# Patient Record
Sex: FEMALE | Race: WHITE
[De-identification: names, ages, dates, MRNs, and addresses within clinical notes are randomized per-mention and may not be internally consistent; named-entity substitution may affect disease eponyms.]

---

## 2018-12-08 ENCOUNTER — HOSPITAL ENCOUNTER (OUTPATIENT)
Dept: HOSPITAL 88 - OR | Age: 43
Discharge: HOME | End: 2018-12-08
Attending: INTERNAL MEDICINE
Payer: COMMERCIAL

## 2018-12-08 VITALS — SYSTOLIC BLOOD PRESSURE: 100 MMHG | DIASTOLIC BLOOD PRESSURE: 60 MMHG

## 2018-12-08 DIAGNOSIS — K44.9: ICD-10-CM

## 2018-12-08 DIAGNOSIS — K22.2: ICD-10-CM

## 2018-12-08 DIAGNOSIS — F41.9: ICD-10-CM

## 2018-12-08 DIAGNOSIS — K58.2: ICD-10-CM

## 2018-12-08 DIAGNOSIS — K64.8: ICD-10-CM

## 2018-12-08 DIAGNOSIS — Z88.5: ICD-10-CM

## 2018-12-08 DIAGNOSIS — K29.50: ICD-10-CM

## 2018-12-08 DIAGNOSIS — I34.1: ICD-10-CM

## 2018-12-08 DIAGNOSIS — N39.0: ICD-10-CM

## 2018-12-08 DIAGNOSIS — I49.3: ICD-10-CM

## 2018-12-08 DIAGNOSIS — I47.1: ICD-10-CM

## 2018-12-08 DIAGNOSIS — K21.0: Primary | ICD-10-CM

## 2018-12-08 DIAGNOSIS — K63.5: ICD-10-CM

## 2018-12-08 DIAGNOSIS — K22.9: ICD-10-CM

## 2018-12-08 DIAGNOSIS — G43.909: ICD-10-CM

## 2018-12-08 LAB — LACTOFERRIN STL QL: NEGATIVE

## 2018-12-08 PROCEDURE — 83993 ASSAY FOR CALPROTECTIN FECAL: CPT

## 2018-12-08 PROCEDURE — 45380 COLONOSCOPY AND BIOPSY: CPT

## 2018-12-08 PROCEDURE — 45385 COLONOSCOPY W/LESION REMOVAL: CPT

## 2018-12-08 PROCEDURE — 45384 COLONOSCOPY W/LESION REMOVAL: CPT

## 2018-12-08 PROCEDURE — 87177 OVA AND PARASITES SMEARS: CPT

## 2018-12-08 PROCEDURE — 87328 CRYPTOSPORIDIUM AG IA: CPT

## 2018-12-08 PROCEDURE — 43450 DILATE ESOPHAGUS 1/MULT PASS: CPT

## 2018-12-08 PROCEDURE — 83630 LACTOFERRIN FECAL (QUAL): CPT

## 2018-12-08 PROCEDURE — 81025 URINE PREGNANCY TEST: CPT

## 2018-12-08 PROCEDURE — 87493 C DIFF AMPLIFIED PROBE: CPT

## 2018-12-08 PROCEDURE — 93005 ELECTROCARDIOGRAM TRACING: CPT

## 2018-12-08 PROCEDURE — 87045 FECES CULTURE AEROBIC BACT: CPT

## 2018-12-08 PROCEDURE — 43239 EGD BIOPSY SINGLE/MULTIPLE: CPT

## 2018-12-08 NOTE — OPERATIVE REPORT
DATE OF PROCEDURE:  December 08, 2018 



REFERRING PHYSICIAN:  Dr. Roman Lentz.



PROCEDURES PERFORMED

1. Esophagogastroduodenoscopy with esophageal dilatation and biopsies.

Colonoscopy with polypectomy and biopsies.

1. 

INDICATIONS FOR ESOPHAGOGASTRODUODENOSCOPY:  Dysphagia to solids, acid 

reflux.



INDICATIONS FOR COLONOSCOPY:  Chronic diarrhea.



MEDICATION:  Patient was done under MAC.  Please see anesthesiologist's 

note.



PROCEDURE:  With patient in left lateral decubitus position, a flexible 

fiberoptic Olympus gastroscope was introduced into the esophagus under 

direct visualization without any difficulty.  There was some patchy 

erythema noted in distal esophagus.  There was a minute nodule noted just 

above the GE junction and that was biopsied.  The esophagus was dilated to 

size 52-Sudanese Taveras.  The scope was then advanced with ease into the 

stomach traversing a small sliding hiatal hernia.  Mucosa overlying the 

antrum and the body revealed some patchy erythema.  Pylorus appeared to be 

of normal contour and shape and was intubated with ease and the scope was 

advanced all the way to the 2nd portion of the duodenum.  Biopsies were 

obtained from the proximal and 2nd portion to rule out sprue.  The scope 

was then withdrawn back into the stomach and retroflexed, and mucosa 

overlying the fundus and the cardia appeared to be within normal limits.  

The scope was then straightened out and was subsequently withdrawn.  

Patient tolerated the procedure well.



IMPRESSION

1. Mild distal esophagitis.

2. Esophageal stricture dilated to a size 52-Sudanese Taveras.

3. Minute nodule gastroesophageal junction, biopsied.

4. Small sliding hiatal hernia.

5. Gastritis, mild.

6. Rule out sprue.



PLAN:  Follow up histology.  Initiate Protonix 40 mg 1 p.o. a.c. b.i.d. and 

Reglan 10 mg 1 p.o. before supper and at bedtime.



Patient was then turned around.  After adequate lubrication of the anal 

canal, a flexible fiberoptic Olympus colonoscope was inserted into the 

rectum with ease and advanced all the way to the cecum.  It was then 

withdrawn slowly and the mucosa overlying the cecum appeared to be within 

normal limits.  The ileocecal valve was intubated and scope was advanced 

into the terminal ileum.  The mucosa overlying the terminal ileum appeared 

grossly within normal limits and biopsies were obtained.  The scope was 

then withdrawn back into the colon.  It was then withdrawn slowly.  Mucosa 

overlying the ascending and the transverse appeared to be within normal 

limits.  An approximately 6 mm polyp was snared from the splenic flexure.  

Mucosa overlying the left colon revealed some patchy mild inflammatory 

changes and random biopsies were obtained.  One polyp was hot biopsied from 

the sigmoid colon.  The rectum appeared to be within normal limits.  The 

scope was then retroflexed into the distal rectum and small internal 

hemorrhoids were noted, none of which was actively bleeding.  The scope was 

then straightened out and was subsequently withdrawn after securing and 

adequate stool specimen that was sent for the appropriate stool studies.  

Patient tolerated the procedure well.



IMPRESSION

1. Splenic flexure polyp approximately 6 mm removed per snare 

electrocautery.

2. Mild patchy left-sided colitis.

3. Sigmoid colon polyp, hot biopsied.

4. Internal hemorrhoids, none actively bleeding.





PLAN:  Follow up histology.  Follow up stool studies.  Initiate VSL #3 one 

p.o. daily and Bentyl 10 mg 1 p.o. t.i.d.  Patient might benefit from a 

followup colonoscopy in 5 years.









DD:  12/08/2018 17:28

DT:  12/08/2018 18:15

Job#:  [id number] [facility]



cc:ROMAN LENTZ MD

## 2018-12-09 LAB — C DIFFICILE TOXIN A&B AMP PROB: NEGATIVE

## 2018-12-10 NOTE — XMS REPORT
Patient Summary Document

                             Created on: 12/10/2018



ROD SYED

External Reference #: [id number]

: 1975

Sex: Female



Demographics







                          Address                   [address] [place]  [address]

 

                          Home Phone                [phone]

 

                          Preferred Language        Unknown

 

                          Marital Status            Unknown

 

                          Mosque Affiliation     Unknown

 

                          Race                      Unknown

 

                                        Additional Race(s)  

 

                          Ethnic Group              Unknown





Author







                          Author                    [facility]

 

                          Address                   Unknown

 

                          Phone                     Unavailable







Support







                Name            Relationship    Address         Phone

 

                    [employer]     PRS                 [address] [place]  [address]                     [phone]

 

                    [employer]     PRS                 [address] [place]  [address]                     [phone]







Care Team Providers







                    Care Team Member Name    Role                Phone

 

                          Unavailable               Unavailable







Payers







             Payer Name    Policy Type    Policy Number    Effective Date    Expiration Date







Problems

This patient has no known problems.



Allergies, Adverse Reactions, Alerts







          Allergy Name    Allergy Type    Status    Severity    Reaction(s)    Onset Date    Inactive 

Date                      Treating Clinician        Comments

 

        codeine    DA      Active    [place]              2017 00:00:00                     

 

        hydrocodone    DA      Active    2017 00:00:00                     







Medications

This patient has no known medications.

## 2023-07-05 ENCOUNTER — HOSPITAL ENCOUNTER (EMERGENCY)
Dept: HOSPITAL 88 - FSED | Age: 48
Discharge: HOME | End: 2023-07-05
Payer: COMMERCIAL

## 2023-07-05 VITALS — HEIGHT: 67 IN | BODY MASS INDEX: 28.25 KG/M2 | WEIGHT: 180 LBS

## 2023-07-05 VITALS — OXYGEN SATURATION: 100 %

## 2023-07-05 DIAGNOSIS — S11.95XA: ICD-10-CM

## 2023-07-05 DIAGNOSIS — S01.85XA: Primary | ICD-10-CM
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
## 2023-07-05 PROCEDURE — 90471 IMMUNIZATION ADMIN: CPT

## 2023-07-05 PROCEDURE — 99282 EMERGENCY DEPT VISIT SF MDM: CPT
